# Patient Record
Sex: FEMALE | Race: BLACK OR AFRICAN AMERICAN | Employment: FULL TIME | URBAN - METROPOLITAN AREA
[De-identification: names, ages, dates, MRNs, and addresses within clinical notes are randomized per-mention and may not be internally consistent; named-entity substitution may affect disease eponyms.]

---

## 2022-09-08 ENCOUNTER — HOSPITAL ENCOUNTER (EMERGENCY)
Age: 47
Discharge: HOME | End: 2022-09-08
Attending: EMERGENCY MEDICINE
Payer: COMMERCIAL

## 2022-09-08 VITALS
BODY MASS INDEX: 25.69 KG/M2 | HEART RATE: 87 BPM | SYSTOLIC BLOOD PRESSURE: 115 MMHG | TEMPERATURE: 97.8 F | WEIGHT: 145 LBS | HEIGHT: 63 IN | OXYGEN SATURATION: 99 % | DIASTOLIC BLOOD PRESSURE: 66 MMHG | RESPIRATION RATE: 18 BRPM

## 2022-09-08 DIAGNOSIS — Z48.02 ENCOUNTER FOR REMOVAL OF SUTURES: Primary | ICD-10-CM

## 2022-09-08 PROCEDURE — 75810000275 HC EMERGENCY DEPT VISIT NO LEVEL OF CARE

## 2022-09-08 RX ORDER — ZOLPIDEM TARTRATE 5 MG/1
5 TABLET ORAL
COMMUNITY

## 2022-09-08 RX ORDER — ALPRAZOLAM 0.5 MG/1
0.5 TABLET ORAL
COMMUNITY

## 2022-09-09 NOTE — ED TRIAGE NOTES
Patient arrives ambulatory to ED with c/c of right thumb pain. Patient reports on 8/27/22 she cut her self with a cheese grater causing her to have to go to the ED for them to place stitches. She reports she was told the stitches where dissolvable, however some stitches have not dissolved. She reports increased pain to her thumb recently.

## 2022-09-09 NOTE — ED PROVIDER NOTES
EMERGENCY DEPARTMENT HISTORY AND PHYSICAL EXAM    Date: 9/8/2022  Patient Name: Capri Barry    History of Presenting Illness     Chief Complaint   Patient presents with    Wound Check         History Provided By: Patient    Chief Complaint: thumb pain       Additional History (Context):   10:52 PM  Capri Barry is a 52 y.o. female presents to the emergency department C/O right thumb pain. Patient states she cut her thumb on a cheese grater on 8/27 while she was in [place]. She was seen and had sutures placed started on an antibiotic and pain medication. She did finish all the medications. They placed 3 dissolvable sutures to the right thumb during repair. Patient states 1 of which already came out. The the knots of the other 2 sutures have come untied. No redness swelling or drainage. No fevers. PCP: Other, None, MD    Current Outpatient Medications   Medication Sig Dispense Refill    ALPRAZolam (Xanax) 0.5 mg tablet Take 0.5 mg by mouth three (3) times daily as needed for Anxiety. zolpidem (Ambien) 5 mg tablet Take 5 mg by mouth nightly. Past History     Past Medical History:  Past Medical History:   Diagnosis Date    Migraine        Past Surgical History:  History reviewed. No pertinent surgical history. Family History:  History reviewed. No pertinent family history. Social History:  Social History     Tobacco Use    Smoking status: Never     Passive exposure: Never    Smokeless tobacco: Never   Substance Use Topics    Alcohol use: Never    Drug use: Never       Allergies:  No Known Allergies    Review of Systems   Review of Systems   Constitutional:  Negative for chills and fever. Respiratory:  Negative for shortness of breath. Cardiovascular:  Negative for chest pain. Musculoskeletal:  Negative for arthralgias. Skin:  Positive for wound. Neurological:  Negative for weakness and numbness. All other systems reviewed and are negative.     Physical Exam Vitals:    09/08/22 2232   BP: 115/66   Pulse: 87   Resp: 18   Temp: 97.8 °F (36.6 °C)   SpO2: 99%   Weight: 65.8 kg (145 lb)   Height: 5' 3\" (1.6 m)     Physical Exam  Vitals and nursing note reviewed. Constitutional:       Appearance: She is well-developed. HENT:      Head: Normocephalic and atraumatic. Cardiovascular:      Rate and Rhythm: Normal rate and regular rhythm. Heart sounds: Normal heart sounds. No murmur heard. Pulmonary:      Effort: Pulmonary effort is normal. No respiratory distress. Breath sounds: Normal breath sounds. No wheezing or rales. Musculoskeletal:      Cervical back: Normal range of motion and neck supple. Comments: Laceration to the tip of the right thumb with 2 dissolvable sutures still in place but not untied. No redness swelling or drainage, full range of motion, brisk cap refill to the tip of the thumb   Neurological:      Mental Status: She is alert and oriented to person, place, and time. Psychiatric:         Judgment: Judgment normal.         Diagnostic Study Results     Labs:   No results found for this or any previous visit (from the past 12 hour(s)). Radiologic Studies:   No orders to display     CT Results  (Last 48 hours)      None          CXR Results  (Last 48 hours)      None            Medical Decision Making   I am the first provider for this patient. I reviewed the vital signs, available nursing notes, past medical history, past surgical history, family history and social history. Vital Signs: Reviewed the patient's vital signs.     Pulse Oximetry Analysis: 99% on RA       Records Reviewed: Nursing Notes and Old Medical Records    Procedures:  Suture/Staple Removal    Date/Time: 9/8/2022 11:38 PM  Performed by: Sedalia Hatchet, PA  Authorized by: Marquis Thony MD     Consent:     Consent obtained:  Verbal    Consent given by:  Patient    Risks, benefits, and alternatives were discussed: yes      Risks discussed: Bleeding, pain and wound separation  Universal protocol:     Procedure explained and questions answered to patient or proxy's satisfaction: yes      Relevant documents present and verified: yes      Patient identity confirmed:  Verbally with patient  Location:     Location:  Upper extremity    Upper extremity location:  Hand    Hand location:  R thumb  Procedure details:     Wound appearance:  No signs of infection, good wound healing and clean    Number of sutures removed:  2  Post-procedure details:     Post-removal:  No dressing applied    Procedure completion:  Tolerated    ED Course:   10:52 PM Initial assessment performed. The patients presenting problems have been discussed, and they are in agreement with the care plan formulated and outlined with them. I have encouraged them to ask questions as they arise throughout their visit. Discussion:  Pt presents with wound check/suture removal.  Patient had sutures placed on 8/27. They were absorbable sutures 3 placed at the time of repair 1 has already fallen out and the other 2 untied. Wound seems to be healing well no signs of infection. The remaining 2 sutures were removed. Strict return precautions given, pt offering no questions or complaints. Diagnosis and Disposition     DISCHARGE NOTE:  Cierra Fontenot's  results have been reviewed with her. She has been counseled regarding her diagnosis, treatment, and plan. She verbally conveys understanding and agreement of the signs, symptoms, diagnosis, treatment and prognosis and additionally agrees to follow up as discussed. She also agrees with the care-plan and conveys that all of her questions have been answered. I have also provided discharge instructions for her that include: educational information regarding their diagnosis and treatment, and list of reasons why they would want to return to the ED prior to their follow-up appointment, should her condition change.  She has been provided with education for proper emergency department utilization. CLINICAL IMPRESSION:    1. Encounter for removal of sutures        PLAN:  1. D/C Home  2. Discharge Medication List as of 9/8/2022 11:22 PM        3. Follow-up Information       Follow up With Specialties Details Why Contact Info    [facility]  Schedule an appointment as soon as possible for a visit   [id number] [facility], [address] [facility],[address]    [facility] EMERGENCY DEPT Emergency Medicine Schedule an appointment as soon as possible for a visit   [address] [id number]  [phone]                   Please note that this dictation was completed with [employer], the computer voice recognition software. Quite often unanticipated grammatical, syntax, homophones, and other interpretive errors are inadvertently transcribed by the computer software. Please disregard these errors. Please excuse any errors that have escaped final proofreading.